# Patient Record
Sex: FEMALE | Race: WHITE | NOT HISPANIC OR LATINO | URBAN - METROPOLITAN AREA
[De-identification: names, ages, dates, MRNs, and addresses within clinical notes are randomized per-mention and may not be internally consistent; named-entity substitution may affect disease eponyms.]

---

## 2021-03-15 ENCOUNTER — WEB ENCOUNTER (OUTPATIENT)
Dept: URBAN - METROPOLITAN AREA CLINIC 23 | Facility: CLINIC | Age: 51
End: 2021-03-15

## 2021-03-15 ENCOUNTER — DASHBOARD ENCOUNTERS (OUTPATIENT)
Age: 51
End: 2021-03-15

## 2021-03-15 ENCOUNTER — OFFICE VISIT (OUTPATIENT)
Dept: URBAN - METROPOLITAN AREA CLINIC 23 | Facility: CLINIC | Age: 51
End: 2021-03-15
Payer: COMMERCIAL

## 2021-03-15 VITALS
TEMPERATURE: 97.5 F | WEIGHT: 181 LBS | HEIGHT: 67 IN | HEART RATE: 69 BPM | BODY MASS INDEX: 28.41 KG/M2 | SYSTOLIC BLOOD PRESSURE: 111 MMHG | DIASTOLIC BLOOD PRESSURE: 71 MMHG

## 2021-03-15 DIAGNOSIS — E66.9 OBESITY, UNSPECIFIED: ICD-10-CM

## 2021-03-15 DIAGNOSIS — Z86.010 PERSONAL HISTORY OF COLONIC POLYPS: ICD-10-CM

## 2021-03-15 DIAGNOSIS — R16.0 HEPATOMEGALY, NOT ELSEWHERE CLASSIFIED: ICD-10-CM

## 2021-03-15 DIAGNOSIS — K59.00 CONSTIPATION, UNSPECIFIED: ICD-10-CM

## 2021-03-15 DIAGNOSIS — K57.92 DIVERTICULITIS OF INTESTINE, PART UNSPECIFIED, WITHOUT PERFORATION OR ABSCESS WITHOUT BLEEDING: ICD-10-CM

## 2021-03-15 DIAGNOSIS — R16.1 SPLENOMEGALY, NOT ELSEWHERE CLASSIFIED: ICD-10-CM

## 2021-03-15 PROBLEM — 80515008 HEPATOMEGALY: Status: ACTIVE | Noted: 2021-03-15

## 2021-03-15 PROBLEM — 414916001 OBESITY: Status: ACTIVE | Noted: 2021-03-15

## 2021-03-15 PROBLEM — 14760008 CONSTIPATION: Status: ACTIVE | Noted: 2021-03-15

## 2021-03-15 PROBLEM — 16294009 SPLENOMEGALY: Status: ACTIVE | Noted: 2021-03-15

## 2021-03-15 PROBLEM — 111359004 DIVERTICULITIS OF COLON: Status: ACTIVE | Noted: 2021-03-15

## 2021-03-15 PROCEDURE — 99203 OFFICE O/P NEW LOW 30 MIN: CPT | Performed by: INTERNAL MEDICINE

## 2021-03-15 RX ORDER — ERGOCALCIFEROL 1.25 MG/1
CAPSULE ORAL
Qty: 0 | Refills: 0 | Status: ACTIVE | COMMUNITY
Start: 1900-01-01

## 2021-03-15 RX ORDER — LOSARTAN POTASSIUM 25 MG/1
TABLET, FILM COATED ORAL
Qty: 0 | Refills: 0 | Status: ACTIVE | COMMUNITY
Start: 1900-01-01

## 2021-03-15 RX ORDER — MYCOPHENOLATE MOFETIL 500 MG/1
TABLET, FILM COATED ORAL
Qty: 0 | Refills: 0 | Status: ACTIVE | COMMUNITY
Start: 1900-01-01

## 2021-03-15 RX ORDER — PHENAZOPYRIDINE HYDROCHLORIDE 100 MG/1
TABLET, COATED ORAL
Qty: 0 | Refills: 0 | Status: ACTIVE | COMMUNITY
Start: 1900-01-01

## 2021-03-15 NOTE — HPI-TODAY'S VISIT:
51 yo female presenting to [facility]- has not returned since 2018 -she states that she continues to see Dr. Artis for her scleroderma.  has labs every 3-4 months , has had normal liver enzymes during this time.  -she had an episode of abdominal pain in 2/17  - was found on ct to have enlarged diverticulum with surrounding inflammation and wall thickening.  1-2 foci of gas seen  -states that she took augmentin for 10 days -no changes in her bowel movements at this point  -she states that she has been seeing a nutritionist and changed her diet significantly.  she has lost 155 pounds in 11 months via nutrition change  -she does have constipation more now than before.  goes up to 3 days without bm -has other days with urgency

## 2021-03-15 NOTE — HPI-OTHER HISTORIES
The patient was last seen in 2018. She has been followed for history of polyps. She has a known history of scleroderma on CellCept. There is also a lung involvement. Her last colonoscopy was in March 2018 and she had 1 adenomatous polyp

## 2021-03-31 ENCOUNTER — TELEPHONE ENCOUNTER (OUTPATIENT)
Dept: URBAN - METROPOLITAN AREA CLINIC 23 | Facility: CLINIC | Age: 51
End: 2021-03-31

## 2021-04-01 ENCOUNTER — TELEPHONE ENCOUNTER (OUTPATIENT)
Dept: URBAN - METROPOLITAN AREA CLINIC 23 | Facility: CLINIC | Age: 51
End: 2021-04-01

## 2021-05-11 PROBLEM — 428283002 HISTORY OF POLYP OF COLON (SITUATION): Status: ACTIVE | Noted: 2021-03-15

## 2021-05-18 ENCOUNTER — OFFICE VISIT (OUTPATIENT)
Dept: URBAN - METROPOLITAN AREA SURGERY CENTER 15 | Facility: SURGERY CENTER | Age: 51
End: 2021-05-18
Payer: COMMERCIAL

## 2021-05-18 DIAGNOSIS — Z86.010 H/O ADENOMATOUS POLYP OF COLON: ICD-10-CM

## 2021-05-18 PROCEDURE — G8907 PT DOC NO EVENTS ON DISCHARG: HCPCS | Performed by: INTERNAL MEDICINE

## 2021-05-18 PROCEDURE — G0105 COLORECTAL SCRN; HI RISK IND: HCPCS | Performed by: INTERNAL MEDICINE

## 2021-05-18 RX ORDER — ERGOCALCIFEROL 1.25 MG/1
CAPSULE ORAL
Qty: 0 | Refills: 0 | Status: ACTIVE | COMMUNITY
Start: 1900-01-01

## 2021-05-18 RX ORDER — LOSARTAN POTASSIUM 25 MG/1
TABLET, FILM COATED ORAL
Qty: 0 | Refills: 0 | Status: ACTIVE | COMMUNITY
Start: 1900-01-01

## 2021-05-18 RX ORDER — MYCOPHENOLATE MOFETIL 500 MG/1
TABLET, FILM COATED ORAL
Qty: 0 | Refills: 0 | Status: ACTIVE | COMMUNITY
Start: 1900-01-01

## 2021-05-18 RX ORDER — PHENAZOPYRIDINE HYDROCHLORIDE 100 MG/1
TABLET, COATED ORAL
Qty: 0 | Refills: 0 | Status: ACTIVE | COMMUNITY
Start: 1900-01-01